# Patient Record
Sex: FEMALE | URBAN - METROPOLITAN AREA
[De-identification: names, ages, dates, MRNs, and addresses within clinical notes are randomized per-mention and may not be internally consistent; named-entity substitution may affect disease eponyms.]

---
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
## 2021-07-15 NOTE — PROCEDURE: MEDICATION COUNSELING
No Opioid Pregnancy And Lactation Text: These medications can lead to premature delivery and should be avoided during pregnancy. These medications are also present in breast milk in small amounts.

## 2022-01-01 NOTE — PROCEDURE: MEDICATION COUNSELING
49 Doxepin Pregnancy And Lactation Text: This medication is Pregnancy Category C and it isn't known if it is safe during pregnancy. It is also excreted in breast milk and breast feeding isn't recommended.

## 2023-02-09 NOTE — PROCEDURE: MEDICATION COUNSELING
2.01 Carac Counseling:  I discussed with the patient the risks of Carac including but not limited to erythema, scaling, itching, weeping, crusting, and pain.

## 2024-04-21 NOTE — HPI: WARTS (VERRUCA)
30-year-old woman status post  5 weeks ago, complains of opening of  wound the past couple days and also small amount of bleeding from the rectum.  She denies pain/fever/chills/vomiting/dysuria/constipation.
Is This A New Presentation, Or A Follow-Up?: Wart
How Severe Are Your Warts?: mild
Treatment Number (Optional): 1

## 2025-06-04 ENCOUNTER — APPOINTMENT (OUTPATIENT)
Dept: URBAN - METROPOLITAN AREA CLINIC 333 | Facility: CLINIC | Age: 25
Setting detail: DERMATOLOGY
End: 2025-06-04

## 2025-06-04 DIAGNOSIS — L90.5 SCAR CONDITIONS AND FIBROSIS OF SKIN: ICD-10-CM

## 2025-06-04 PROCEDURE — ? COSMETIC CONSULTATION: PRP

## 2025-06-04 PROCEDURE — ? ADDITIONAL NOTES

## 2025-06-04 NOTE — PROCEDURE: ADDITIONAL NOTES
Render Risk Assessment In Note?: no
Detail Level: Simple
Additional Notes: Recommended Plasma Injection pt was quoted $275 per txt and will follow up 2-3 weeks for repeat tx